# Patient Record
Sex: MALE | URBAN - METROPOLITAN AREA
[De-identification: names, ages, dates, MRNs, and addresses within clinical notes are randomized per-mention and may not be internally consistent; named-entity substitution may affect disease eponyms.]

---

## 2020-06-23 ENCOUNTER — HOSPITAL ENCOUNTER (OUTPATIENT)
Age: 25
Discharge: HOME | End: 2020-06-25

## 2020-06-23 PROCEDURE — 86762 RUBELLA ANTIBODY: CPT

## 2020-06-23 PROCEDURE — 86787 VARICELLA-ZOSTER ANTIBODY: CPT

## 2020-06-23 PROCEDURE — 86765 RUBEOLA ANTIBODY: CPT

## 2020-06-23 PROCEDURE — 86735 MUMPS ANTIBODY: CPT

## 2020-06-25 LAB
MEASLES IMMUNE (IGG): NORMAL
MUMPS AB IGG: NORMAL
RUBELLA ANTIBODY IGG: NORMAL
VARICELLA-ZOSTER VIRUS AB, IGG: NORMAL

## 2020-09-24 ENCOUNTER — NURSE ONLY (OUTPATIENT)
Dept: PRIMARY CARE CLINIC | Age: 25
End: 2020-09-24

## 2020-09-24 ENCOUNTER — HOSPITAL ENCOUNTER (OUTPATIENT)
Age: 25
Discharge: HOME | End: 2020-09-26
Payer: COMMERCIAL

## 2020-09-24 PROCEDURE — U0003 INFECTIOUS AGENT DETECTION BY NUCLEIC ACID (DNA OR RNA); SEVERE ACUTE RESPIRATORY SYNDROME CORONAVIRUS 2 (SARS-COV-2) (CORONAVIRUS DISEASE [COVID-19]), AMPLIFIED PROBE TECHNIQUE, MAKING USE OF HIGH THROUGHPUT TECHNOLOGIES AS DESCRIBED BY CMS-2020-01-R: HCPCS

## 2020-09-25 ENCOUNTER — NURSE TRIAGE (OUTPATIENT)
Dept: OTHER | Facility: CLINIC | Age: 25
End: 2020-09-25

## 2020-09-25 NOTE — TELEPHONE ENCOUNTER
period? \" \"Did you deliver in the last 2 weeks? \"      no  10. HIGH RISK: \"Do you have any heart or lung problems? Do you have a weak immune system? \" (e.g., CHF, COPD, asthma, HIV positive, chemotherapy, renal failure, diabetes mellitus, sickle cell anemia)        denies    Protocols used: CORONAVIRUS (COVID-19) EXPOSURE-ADULT-[place], CORONAVIRUS (COVID-19) DIAGNOSED OR SUSPECTED-ADULT-[place]    Pod 2    Caller reports symptoms as documented above. Caller informed of disposition. Care advice as documented. Soft trx to [facility] for further assist.     Please do not respond to the triage nurse through this encounter. Any subsequent communication should be directly with the patient.

## 2020-09-26 LAB
SARS-COV-2: DETECTED
SOURCE: ABNORMAL

## 2022-09-16 LAB
CHOLESTEROL, TOTAL: 235 MG/DL (ref 0–199)
GLUCOSE BLD-MCNC: 95 MG/DL (ref 74–107)
HDLC SERPL-MCNC: 45 MG/DL
LDL CHOLESTEROL CALCULATED: 142 MG/DL (ref 0–99)
TRIGL SERPL-MCNC: 242 MG/DL (ref 0–149)